# Patient Record
Sex: FEMALE | Race: ASIAN | NOT HISPANIC OR LATINO | URBAN - METROPOLITAN AREA
[De-identification: names, ages, dates, MRNs, and addresses within clinical notes are randomized per-mention and may not be internally consistent; named-entity substitution may affect disease eponyms.]

---

## 2018-10-17 ENCOUNTER — EMERGENCY (EMERGENCY)
Facility: HOSPITAL | Age: 33
LOS: 0 days | Discharge: LEFT BEFORE DISCHARGE | End: 2018-10-17
Attending: EMERGENCY MEDICINE
Payer: MEDICAID

## 2018-10-17 VITALS
DIASTOLIC BLOOD PRESSURE: 68 MMHG | HEART RATE: 81 BPM | SYSTOLIC BLOOD PRESSURE: 119 MMHG | WEIGHT: 220.02 LBS | OXYGEN SATURATION: 99 % | HEIGHT: 64 IN | RESPIRATION RATE: 18 BRPM | TEMPERATURE: 98 F

## 2018-10-17 VITALS
HEART RATE: 74 BPM | SYSTOLIC BLOOD PRESSURE: 103 MMHG | OXYGEN SATURATION: 96 % | RESPIRATION RATE: 19 BRPM | TEMPERATURE: 99 F | DIASTOLIC BLOOD PRESSURE: 68 MMHG

## 2018-10-17 PROCEDURE — 93010 ELECTROCARDIOGRAM REPORT: CPT

## 2018-10-17 PROCEDURE — 99284 EMERGENCY DEPT VISIT MOD MDM: CPT

## 2018-10-17 RX ORDER — SODIUM CHLORIDE 9 MG/ML
1000 INJECTION INTRAMUSCULAR; INTRAVENOUS; SUBCUTANEOUS ONCE
Qty: 0 | Refills: 0 | Status: DISCONTINUED | OUTPATIENT
Start: 2018-10-17 | End: 2018-10-17

## 2018-10-17 NOTE — ED ADULT NURSE NOTE - OBJECTIVE STATEMENT
pt c/o palpitations, 1st episode last week, another episode of palpitaions at 5pm, lasting 4-5 minutes. denies chest pain, dizziness sob

## 2018-10-17 NOTE — ED PROVIDER NOTE - OBJECTIVE STATEMENT
34yo female with no pertinent pmh, presents with 5 min of palpitaitons and mild ss chest burning today. Pt also noted it 6 days ago and resolved. Pt just came for evaluation. All symptoms have resolved. nonsmoker. + IUD. Denies recent immobilization, surgery, long trips or plane rides, leg pain or swelling or family or personal history of blood clotting disorder     ROS: No fever/chills. No photophobia/eye pain/changes in vision, No ear pain/sore throat/dysphagia, + chest pain/+palpitations. No SOB/cough/stridor. No abdominal pain, N/V/D, no black/bloody bm. No dysuria/frequency/discharge, No headache. No Dizziness.  No rash.  No numbness/tingling/weakness.

## 2018-10-17 NOTE — ED PROVIDER NOTE - MEDICAL DECISION MAKING DETAILS
I evaluated pt and was pending EKG but reports she does not want to stay for workup and would like to fu with pmd. The patient has decided to leave against medical advice.  We discussed all risks, benefits, and alternatives to the progression of treatment and the potential dangers of leaving including but not limited to permanent disability, injury, and death.  The patient was instructed that they are welcome to change their decision to leave against medical advice and return to the emergency department at any time and for any reason in order to allow us to render care.

## 2018-10-17 NOTE — ED ADULT NURSE REASSESSMENT NOTE - NS ED NURSE REASSESS COMMENT FT1
pt alert and oriented x4. pt denies discomfort and states, "I have to go, I feel fine.  I cannot stay here for three/four hours." Pt was educated regarding chest pain and plan of care. Pt would like to leave.

## 2018-10-18 DIAGNOSIS — R00.2 PALPITATIONS: ICD-10-CM

## 2018-10-18 DIAGNOSIS — R07.9 CHEST PAIN, UNSPECIFIED: ICD-10-CM

## 2020-01-24 ENCOUNTER — EMERGENCY (EMERGENCY)
Facility: HOSPITAL | Age: 35
LOS: 0 days | Discharge: HOME | End: 2020-01-24
Attending: EMERGENCY MEDICINE
Payer: MEDICAID

## 2020-01-24 VITALS
DIASTOLIC BLOOD PRESSURE: 79 MMHG | TEMPERATURE: 98 F | OXYGEN SATURATION: 99 % | SYSTOLIC BLOOD PRESSURE: 123 MMHG | HEART RATE: 74 BPM | WEIGHT: 184.97 LBS | RESPIRATION RATE: 16 BRPM | HEIGHT: 63 IN

## 2020-01-24 DIAGNOSIS — R07.9 CHEST PAIN, UNSPECIFIED: ICD-10-CM

## 2020-01-24 PROCEDURE — 99284 EMERGENCY DEPT VISIT MOD MDM: CPT

## 2020-01-24 PROCEDURE — 93010 ELECTROCARDIOGRAM REPORT: CPT

## 2020-01-24 NOTE — ED ADULT NURSE NOTE - OBJECTIVE STATEMENT
mid sternal chest pressure 10/10 sudden onset 6 minutes. She was just sitting The pain resolved spontaneous. She completed antibiotic for Flu and other in the home with the same symptoms. She has no pain at this tome

## 2020-01-24 NOTE — ED PROVIDER NOTE - PATIENT PORTAL LINK FT
You can access the FollowMyHealth Patient Portal offered by [facility] by registering at the following website: [email or web address]. By joining [facility]’s FollowMyHealth portal, you will also be able to view your health information using other applications (apps) compatible with our system.

## 2020-01-24 NOTE — ED PROVIDER NOTE - CHPI ED SYMPTOMS NEG
no shortness of breath/no nausea/no syncope/no chills/no diaphoresis/no cough/no fever/no vomiting/no back pain/no dizziness

## 2020-01-25 PROBLEM — G43.909 MIGRAINE, UNSPECIFIED, NOT INTRACTABLE, WITHOUT STATUS MIGRAINOSUS: Chronic | Status: ACTIVE | Noted: 2018-10-17

## 2021-05-02 ENCOUNTER — EMERGENCY (EMERGENCY)
Facility: HOSPITAL | Age: 36
LOS: 0 days | Discharge: HOME | End: 2021-05-02
Attending: EMERGENCY MEDICINE
Payer: MEDICAID

## 2021-05-02 VITALS
DIASTOLIC BLOOD PRESSURE: 90 MMHG | HEART RATE: 92 BPM | RESPIRATION RATE: 20 BRPM | TEMPERATURE: 99 F | HEIGHT: 63 IN | SYSTOLIC BLOOD PRESSURE: 128 MMHG | WEIGHT: 175.05 LBS | OXYGEN SATURATION: 100 %

## 2021-05-02 DIAGNOSIS — L03.011 CELLULITIS OF RIGHT FINGER: ICD-10-CM

## 2021-05-02 DIAGNOSIS — M79.644 PAIN IN RIGHT FINGER(S): ICD-10-CM

## 2021-05-02 PROCEDURE — 99283 EMERGENCY DEPT VISIT LOW MDM: CPT | Mod: 25

## 2021-05-02 PROCEDURE — 10060 I&D ABSCESS SIMPLE/SINGLE: CPT

## 2021-05-02 NOTE — ED PROCEDURE NOTE - PROCEDURE ADDITIONAL DETAILS
Paronychia attempted drain otherwise without pus drainable  - only blood on incision across nailbed on R index after digital block.

## 2021-05-02 NOTE — ED PROVIDER NOTE - OBJECTIVE STATEMENT
36 year old female with no significant pMH other than migraines presenting to ED due to R 2nd digit nailbed swelling - states otherwise was seen in Urgent care 24 hours ago and given Abx and otherwise since pain was continuing - came into ED for evaluation. Pt unaware of any cause for nailbed area swelling or infection

## 2021-05-02 NOTE — ED PROVIDER NOTE - PATIENT PORTAL LINK FT
You can access the FollowMyHealth Patient Portal offered by [facility] by registering at the following website: [email or web address]. By joining [facility]’s FollowMyHealth portal, you will also be able to view your health information using other applications (apps) compatible with our system.

## 2021-05-02 NOTE — ED PROVIDER NOTE - MUSCULOSKELETAL MINIMAL EXAM
R 2nd digit with swelling at base of nail with mild swelling in soft tissue area tender to palpation, no swelling by volar aspect of finger.

## 2021-05-02 NOTE — ED ADULT TRIAGE NOTE - CHIEF COMPLAINT QUOTE
pt c/o R index finger swelling and pain x 2 days. pt stated, she was seen at urgent care yesterday and given antibiotics, but its not helping.

## 2021-05-02 NOTE — ED PROVIDER NOTE - CLINICAL SUMMARY MEDICAL DECISION MAKING FREE TEXT BOX
paronychia attempted drainage as area with swelling, no pus drainable noted - will recommened to continue clindamycin and return to hospital if fever/chills develop otherwise to follow up hand Dr. Smith. To continue Clindamycin as pt only had 3 doses so far.

## 2021-05-02 NOTE — ED ADULT TRIAGE NOTE - BMI (KG/M2)
PHYSICIAN NEXT STEPS:   Call the Patient      CHIEF COMPLAINT:   Chief Complaint/Protocol Used: Vaginal Bleeding - Abnormal   Onset: Fri          ASSESSMENT:   Â» Onset: Fri    Â» Normal True   Â» Normal, no trouble breathing True   Â» Amount: 4 soaked pads per day    Â» Onset: Fri    Â» Menstrual Period: Oct   Â» Abdominal Pain: Yes, cramps since Fri, severe (missed work yesterday)   Â» Pregnancy: No, yes   Â» Hormones: Mylan   Â» Blood Thinners: No    Â» Cause: Mylan, break thru bleeding?   Â» Hemodynamic Status:  No    Â» Other Symptoms: Passed a little tissue - is normal when gets her period, gets headache when has menses   -------------------------------------------------------      DISPOSITION:   Disposition Recommendation: Home Care   Questions that led to disposition:   Â» Using birth control pills   Patient Directed To: Unspecified   Patient Intended Action: Unspecified      CALL NOTES:   03/13/2018 at 3:44 PM by Kristian MARINELLI» Wants to be called back.    Â» Unsure if Mylan is working. Asks if this bleeding for this long is normal.       DISPOSITION OVERRIDE/PROVIDER CONSULT:   Disposition Override: N/A   Override Source: Unspecified   Consulted with PCP: No   Consulted with On-Call Physician: No         CALLER CONTACT INFO:   Name: EMORY NINA (Self)   Phone 1: [phone] (Home) - Preferred   Phone 2: [phone] (Cell)   Phone 3: [phone] (Work)         ENCOUNTER STARTED:   03/13/18 03:33:23 PM   ENCOUNTER ASSIGNED TO/CLOSED BY:   Kristian Gardner @ 03/13/18 03:44:35 PM         -------------------------------------------------------      CARE ADVICE given per Vaginal Bleeding - Abnormal guideline.   CALL BACK IF:     * Severe abdominal pain or dizziness occurs    * Bleeding increases    * You become worse.         UNDERSTANDS CARE ADVICE: Yes      AGREES WITH CARE ADVICE: Yes      WILL FOLLOW CARE ADVICE: Yes      -------------------------------------------------------   31

## 2021-05-02 NOTE — ED PROVIDER NOTE - MUSCULOSKELETAL [+], MLM
R 2nd digit nailbed area with swelling distally with mild induration and slight fluctuance noted, distal sensation/motor of finger intact

## 2021-05-02 NOTE — ED ADULT TRIAGE NOTE - IDEAL BODY WEIGHT(KG)
Progress Notes by Alfonso Beasley RN at 11/16/18 01:34 PM     Author:  Alfonso Beasley RN Service:  (none) Author Type:  Registered Nurse     Filed:  11/16/18 01:34 PM Encounter Date:  11/16/2018 Status:  Signed     :  Alfonso Beasley RN (Registered Nurse)            LEFT MESSAGE TO CALL BACK  Electronically Signed by:    Alfonso Beasley RN , 11/16/2018[GM1.1T]        Revision History        User Key Date/Time User Provider Type Action    > GM1.1 11/16/18 01:34 PM Alfonso Beasley RN Registered Nurse Sign    T - Template 52

## 2022-11-06 NOTE — ED PROVIDER NOTE - CPE EDP ENMT NORM
This patient has been assessed with a concern for Malnutrition and was treated during this hospitalization for the following Nutrition diagnosis/diagnoses:     -  11/06/2022: Severe protein-calorie malnutrition   normal...

## 2024-09-02 ENCOUNTER — EMERGENCY (EMERGENCY)
Facility: HOSPITAL | Age: 39
LOS: 0 days | Discharge: HOME | End: 2024-09-03
Payer: COMMERCIAL

## 2024-09-02 VITALS
OXYGEN SATURATION: 100 % | DIASTOLIC BLOOD PRESSURE: 79 MMHG | RESPIRATION RATE: 19 BRPM | TEMPERATURE: 98 F | SYSTOLIC BLOOD PRESSURE: 114 MMHG | WEIGHT: 179.9 LBS | HEART RATE: 76 BPM | HEIGHT: 63 IN

## 2024-09-02 PROCEDURE — 93010 ELECTROCARDIOGRAM REPORT: CPT

## 2024-09-02 PROCEDURE — L9991: CPT

## 2024-09-02 NOTE — ED ADULT TRIAGE NOTE - CHIEF COMPLAINT QUOTE
Patient reports Left shoulder, back pain radiating to chest. + SOB. Patient reports Nausea and vomiting around 6pm. Pain started around 10pm. Pain 7-8/10. Denies trauma or injury  PMH: palpitations